# Patient Record
Sex: MALE | Race: WHITE | Employment: STUDENT | URBAN - METROPOLITAN AREA
[De-identification: names, ages, dates, MRNs, and addresses within clinical notes are randomized per-mention and may not be internally consistent; named-entity substitution may affect disease eponyms.]

---

## 2017-11-03 ENCOUNTER — OFFICE VISIT (OUTPATIENT)
Dept: FAMILY MEDICINE CLINIC | Facility: CLINIC | Age: 22
End: 2017-11-03

## 2017-11-03 VITALS
OXYGEN SATURATION: 98 % | DIASTOLIC BLOOD PRESSURE: 62 MMHG | SYSTOLIC BLOOD PRESSURE: 128 MMHG | RESPIRATION RATE: 20 BRPM | WEIGHT: 180.63 LBS | HEART RATE: 71 BPM | BODY MASS INDEX: 25.29 KG/M2 | HEIGHT: 71 IN | TEMPERATURE: 99 F

## 2017-11-03 DIAGNOSIS — J02.0 STREP THROAT: Primary | ICD-10-CM

## 2017-11-03 DIAGNOSIS — J02.9 SORE THROAT: ICD-10-CM

## 2017-11-03 DIAGNOSIS — B27.90 MONOSPOT TEST POSITIVE: ICD-10-CM

## 2017-11-03 PROCEDURE — 87880 STREP A ASSAY W/OPTIC: CPT | Performed by: NURSE PRACTITIONER

## 2017-11-03 PROCEDURE — 99213 OFFICE O/P EST LOW 20 MIN: CPT | Performed by: NURSE PRACTITIONER

## 2017-11-03 PROCEDURE — 86308 HETEROPHILE ANTIBODY SCREEN: CPT | Performed by: NURSE PRACTITIONER

## 2017-11-03 RX ORDER — AZITHROMYCIN 500 MG/1
500 TABLET, FILM COATED ORAL DAILY
Qty: 5 TABLET | Refills: 0 | Status: SHIPPED | OUTPATIENT
Start: 2017-11-03 | End: 2017-11-08

## 2017-11-03 NOTE — PROGRESS NOTES
CHIEF COMPLAINT:   Patient presents with:  Sinus Problem: congestion, sore throat, drainage throat, x 1month      HPI:   Prince Vaughn is a 25year old male presents to clinic with symptoms of sore throat. Patient has had for 30 days.  Symptoms have been LUNGS: clear to auscultation bilaterally, no wheezes or rhonchi. Breathing is non labored.   CARDIO: RRR without murmur  GI: good BS's,no masses, hepatosplenomegaly, or tenderness on direct palpation  EXTREMITIES: no cyanosis, clubbing or edema  LYMPH: pos Patient Instructions     Pharyngitis: Strep (Confirmed)    You have had a positive test for strep throat. Strep throat is a contagious illness. It is spread by coughing, kissing or by touching others after touching your mouth or nose.  Symptoms include thro · Lymph nodes getting larger or becoming soft in the middle  · You can't swallow liquids or you can't open your mouth wide because of throat pain  · Signs of dehydration. These include very dark urine or no urine, sunken eyes, and dizziness.   · Trouble robert Taking the antibiotics ampicillin or amoxicillin during a mono infection may cause a skin rash. This is not serious and will fade in about one week. The cause is a reaction of the drug with the virus. Mono can cause your spleen to swell.  The spleen is a f · Severe or worsening abdominal pain  · Trouble breathing  Date Last Reviewed: 9/25/2015  © 3073-3556 [employer]. [address] [facility], [address] [place] [address]. All rights reserved.  This information is not intended as a substitute for profession

## 2017-11-03 NOTE — PATIENT INSTRUCTIONS
Pharyngitis: Strep (Confirmed)    You have had a positive test for strep throat. Strep throat is a contagious illness. It is spread by coughing, kissing or by touching others after touching your mouth or nose.  Symptoms include throat pain that is worse w · You can't swallow liquids or you can't open your mouth wide because of throat pain  · Signs of dehydration. These include very dark urine or no urine, sunken eyes, and dizziness.   · Trouble breathing or noisy breathing  · Muffled voice  · Rash  Date Last Mono can cause your spleen to swell. The spleen is a fist-sized organ in the upper left abdomen that stores red blood cells. Injury to a swollen spleen can cause the spleen to rupture. This can cause life-threatening internal bleeding.  To avoid this, do no © 9770-7701 [employer]. [address] [facility], [facility] [place]. All rights reserved. This information is not intended as a substitute for professional medical care. Always follow your healthcare professional's instructions.

## 2017-11-04 RX ORDER — AZITHROMYCIN 250 MG/1
TABLET, FILM COATED ORAL
Qty: 6 TABLET | Refills: 0 | Status: SHIPPED | OUTPATIENT
Start: 2017-11-04 | End: 2017-11-20

## 2018-05-14 ENCOUNTER — APPOINTMENT (OUTPATIENT)
Dept: GENERAL RADIOLOGY | Facility: HOSPITAL | Age: 23
End: 2018-05-14
Payer: COMMERCIAL

## 2018-05-14 ENCOUNTER — HOSPITAL ENCOUNTER (EMERGENCY)
Facility: HOSPITAL | Age: 23
Discharge: HOME | End: 2018-05-14
Attending: EMERGENCY MEDICINE
Payer: COMMERCIAL

## 2018-05-14 VITALS
HEIGHT: 72 IN | TEMPERATURE: 98 F | OXYGEN SATURATION: 99 % | WEIGHT: 178 LBS | HEART RATE: 43 BPM | DIASTOLIC BLOOD PRESSURE: 81 MMHG | SYSTOLIC BLOOD PRESSURE: 133 MMHG | BODY MASS INDEX: 24.11 KG/M2 | RESPIRATION RATE: 13 BRPM

## 2018-05-14 DIAGNOSIS — R07.9 CHEST PAIN OF UNCERTAIN ETIOLOGY: Primary | ICD-10-CM

## 2018-05-14 PROCEDURE — 85025 COMPLETE CBC W/AUTO DIFF WBC: CPT

## 2018-05-14 PROCEDURE — 85378 FIBRIN DEGRADE SEMIQUANT: CPT | Performed by: EMERGENCY MEDICINE

## 2018-05-14 PROCEDURE — 96374 THER/PROPH/DIAG INJ IV PUSH: CPT

## 2018-05-14 PROCEDURE — 93010 ELECTROCARDIOGRAM REPORT: CPT

## 2018-05-14 PROCEDURE — 99285 EMERGENCY DEPT VISIT HI MDM: CPT

## 2018-05-14 PROCEDURE — 71045 X-RAY EXAM CHEST 1 VIEW: CPT

## 2018-05-14 PROCEDURE — 84484 ASSAY OF TROPONIN QUANT: CPT | Performed by: EMERGENCY MEDICINE

## 2018-05-14 PROCEDURE — 80053 COMPREHEN METABOLIC PANEL: CPT | Performed by: EMERGENCY MEDICINE

## 2018-05-14 PROCEDURE — 85025 COMPLETE CBC W/AUTO DIFF WBC: CPT | Performed by: EMERGENCY MEDICINE

## 2018-05-14 PROCEDURE — 80053 COMPREHEN METABOLIC PANEL: CPT

## 2018-05-14 PROCEDURE — 84484 ASSAY OF TROPONIN QUANT: CPT

## 2018-05-14 PROCEDURE — 93005 ELECTROCARDIOGRAM TRACING: CPT

## 2018-05-14 RX ORDER — KETOROLAC TROMETHAMINE 30 MG/ML
15 INJECTION, SOLUTION INTRAMUSCULAR; INTRAVENOUS ONCE
Status: COMPLETED | OUTPATIENT
Start: 2018-05-14 | End: 2018-05-14

## 2018-05-14 RX ORDER — ASPIRIN 81 MG/1
324 TABLET, CHEWABLE ORAL ONCE
Status: COMPLETED | OUTPATIENT
Start: 2018-05-14 | End: 2018-05-14

## 2018-05-14 NOTE — ED NOTES
Patient is resting comfortably stating cp is better denies throbbing chest pain stating its more of an ache at this time.  2/10 resps easy nonlabored

## 2018-05-14 NOTE — ED INITIAL ASSESSMENT (HPI)
Pt here stating he developed constant throbbing chest pain upon awakening this morning. +sob denies radiation/nausea. Denies illicit drug use. + congestion. Pt stating he had mono and strep for 4 months last fall to December.

## 2018-05-14 NOTE — ED PROVIDER NOTES
Patient Seen in: [facility] Emergency Department    History   Patient presents with:  Chest Pain Angina (cardiovascular)    Stated Complaint: CP    HPI    Previously healthy 21-year-old presents for evaluation of chest pain.   It was there when he wo within normal limits. Mucous members are moist.   Cardiovascular: Regular rate and rhythm, normal S1-S2. Respiratory: Lungs are clear to auscultation bilaterally. Abdomen: Soft, nontender, nondistended. Back: No CVA tenderness.    Extremities: No edema x-ray without cardiomegaly pneumonia or pneumothorax. At this point I do feel he can be discharged home for outpatient follow-up. He is referred to his primary care physician for follow-up. He is advised to refrain from exercise until he follows up.   He

## (undated) NOTE — ED AVS SNAPSHOT
Edy Kamryn   MRN: [id number]    Department:  [facility] Emergency Department   Date of Visit:  5/14/2018           Disclosure     Insurance plans vary and the physician(s) referred by the ER may not be covered by your plan.  Please contact you tell this physician (or your personal doctor if your instructions are to return to your personal doctor) about any new or lasting problems. The primary care or specialist physician will see patients referred from the [facility] Emergency Department.  Krystian Lopez

## (undated) NOTE — LETTER
Date: 11/3/2017    Patient Name: Sherry Yuan          To Whom it may concern:     This letter has been written at the patient's request. The above patient was seen at the [facility] for treatment of a medical condition on this date 11/3/17